# Patient Record
Sex: MALE | URBAN - METROPOLITAN AREA
[De-identification: names, ages, dates, MRNs, and addresses within clinical notes are randomized per-mention and may not be internally consistent; named-entity substitution may affect disease eponyms.]

---

## 2021-05-27 ENCOUNTER — OFFICE VISIT (OUTPATIENT)
Dept: URBAN - METROPOLITAN AREA CLINIC 91 | Facility: CLINIC | Age: 76
End: 2021-05-27
Payer: MEDICARE

## 2021-05-27 DIAGNOSIS — H40.1131 PRIMARY OPEN-ANGLE GLAUCOMA, BILATERAL, MILD STAGE: Primary | ICD-10-CM

## 2021-05-27 DIAGNOSIS — H04.123 DRY EYE SYNDROME OF BILATERAL LACRIMAL GLANDS: ICD-10-CM

## 2021-05-27 PROCEDURE — 92083 EXTENDED VISUAL FIELD XM: CPT | Performed by: SPECIALIST

## 2021-05-27 PROCEDURE — 92133 CPTRZD OPH DX IMG PST SGM ON: CPT | Performed by: SPECIALIST

## 2021-05-27 PROCEDURE — 99214 OFFICE O/P EST MOD 30 MIN: CPT | Performed by: SPECIALIST

## 2021-05-27 ASSESSMENT — INTRAOCULAR PRESSURE
OD: 14
OS: 13

## 2021-05-27 NOTE — IMPRESSION/PLAN
Impression: Primary open-angle glaucoma, bilateral, mild stage: H40.1131. Plan: IOP's and VF stable OU, Continue Lumigan OU QHS. Discussed primary open angle glaucoma. I have stressed importance of patient compliance with follow-up appointments and using glaucoma drops as directed. I have explained the risk of irreversible vision loss and possible blindness associated with glaucoma. Will continue to monitor patient's glaucoma status with OCTg, visual field testing and fundus photography.

## 2021-11-30 ENCOUNTER — OFFICE VISIT (OUTPATIENT)
Dept: URBAN - METROPOLITAN AREA CLINIC 91 | Facility: CLINIC | Age: 76
End: 2021-11-30
Payer: MEDICARE

## 2021-11-30 DIAGNOSIS — H25.13 AGE-RELATED NUCLEAR CATARACT, BILATERAL: ICD-10-CM

## 2021-11-30 PROCEDURE — 99213 OFFICE O/P EST LOW 20 MIN: CPT | Performed by: SPECIALIST

## 2021-11-30 RX ORDER — BIMATOPROST 0.1 MG/ML
0.01 % SOLUTION/ DROPS OPHTHALMIC
Qty: 7.5 | Refills: 3 | Status: ACTIVE
Start: 2021-11-30

## 2021-11-30 ASSESSMENT — INTRAOCULAR PRESSURE
OS: 16
OD: 16

## 2021-11-30 NOTE — IMPRESSION/PLAN
Impression: Primary open-angle glaucoma, bilateral, mild stage: H40.1131. Plan: IOP well controlled OU and Fundus photo taken today OU. Patient advised to continue use of Lumigan OU QHS. Discussed primary open angle glaucoma. I have stressed importance of patient compliance with follow-up appointments and using glaucoma drops as directed. I have explained the risk of irreversible vision loss and possible blindness associated with glaucoma. Will continue to monitor patient's glaucoma status with OCTg, visual field testing and fundus photography.

## 2022-06-02 ENCOUNTER — OFFICE VISIT (OUTPATIENT)
Dept: URBAN - METROPOLITAN AREA CLINIC 91 | Facility: CLINIC | Age: 77
End: 2022-06-02
Payer: MEDICARE

## 2022-06-02 DIAGNOSIS — H25.13 AGE-RELATED NUCLEAR CATARACT, BILATERAL: ICD-10-CM

## 2022-06-02 DIAGNOSIS — H40.1131 PRIMARY OPEN-ANGLE GLAUCOMA, BILATERAL, MILD STAGE: Primary | ICD-10-CM

## 2022-06-02 DIAGNOSIS — H04.123 DRY EYE SYNDROME OF BILATERAL LACRIMAL GLANDS: ICD-10-CM

## 2022-06-02 DIAGNOSIS — H52.4 PRESBYOPIA: ICD-10-CM

## 2022-06-02 PROCEDURE — 92133 CPTRZD OPH DX IMG PST SGM ON: CPT | Performed by: SPECIALIST

## 2022-06-02 PROCEDURE — 99214 OFFICE O/P EST MOD 30 MIN: CPT | Performed by: SPECIALIST

## 2022-06-02 RX ORDER — BEPOTASTINE BESILATE 15 MG/ML
1.5 % SOLUTION/ DROPS OPHTHALMIC
Qty: 5 | Refills: 2 | Status: ACTIVE
Start: 2022-06-02

## 2022-06-02 ASSESSMENT — INTRAOCULAR PRESSURE
OS: 16
OD: 16

## 2022-06-02 ASSESSMENT — VISUAL ACUITY: OD: 20/20

## 2022-06-02 NOTE — IMPRESSION/PLAN
Impression: Dry eye syndrome of bilateral lacrimal glands: H04.123. Plan: For dry eye syndrome, I have recommended patient use a good quality artificial tear 4-6 times per day. Ok to start Bepreve BID OU as needed.

## 2022-06-02 NOTE — IMPRESSION/PLAN
Impression: Primary open-angle glaucoma, bilateral, mild stage: H40.1131. Plan: IOP's and OCTg stable OU, continue using Lumigan QHS OU. Discussed primary open angle glaucoma. I have stressed importance of patient compliance with follow-up appointments and using glaucoma drops as directed. I have explained the risk of irreversible vision loss and possible blindness associated with glaucoma. Will continue to monitor patient's glaucoma status with OCTg, visual field testing and fundus photography.

## 2022-12-07 ENCOUNTER — OFFICE VISIT (OUTPATIENT)
Dept: URBAN - METROPOLITAN AREA CLINIC 91 | Facility: CLINIC | Age: 77
End: 2022-12-07
Payer: MEDICARE

## 2022-12-07 DIAGNOSIS — H40.1131 PRIMARY OPEN-ANGLE GLAUCOMA, BILATERAL, MILD STAGE: Primary | ICD-10-CM

## 2022-12-07 DIAGNOSIS — H16.223 KERATOCONJUNCT SICCA, NOT SPECIFIED AS SJOGREN'S, BILATERAL: ICD-10-CM

## 2022-12-07 PROCEDURE — 99213 OFFICE O/P EST LOW 20 MIN: CPT | Performed by: SPECIALIST

## 2022-12-07 PROCEDURE — 92083 EXTENDED VISUAL FIELD XM: CPT | Performed by: SPECIALIST

## 2022-12-07 RX ORDER — CYCLOSPORINE 0.5 MG/ML
0.05 % EMULSION OPHTHALMIC
Qty: 180 | Refills: 3 | Status: ACTIVE
Start: 2022-12-07

## 2022-12-07 ASSESSMENT — INTRAOCULAR PRESSURE
OS: 19
OD: 16

## 2022-12-07 NOTE — IMPRESSION/PLAN
Impression: Keratoconjunct sicca, not specified as Sjogren's, bilateral: E50.509. Plan: For severe dry eye syndrome, I have recommended Restasis or Job Hilda for the patient. Discussed options of punctal plugs, Lipiflow, AT's gtts 3-6x a day.

## 2022-12-07 NOTE — IMPRESSION/PLAN
Impression: Primary open-angle glaucoma, bilateral, mild stage: H40.1131. Plan: IOP stable VF stable OU, continue using Lumigan QHS OU. Discussed primary open angle glaucoma. I have stressed importance of patient compliance with follow-up appointments and using glaucoma drops as directed. I have explained the risk of irreversible vision loss and possible blindness associated with glaucoma. Will continue to monitor patient's glaucoma status with OCTg, visual field testing and fundus photography.

## 2023-06-07 ENCOUNTER — OFFICE VISIT (OUTPATIENT)
Dept: URBAN - METROPOLITAN AREA CLINIC 91 | Facility: CLINIC | Age: 78
End: 2023-06-07
Payer: MEDICARE

## 2023-06-07 DIAGNOSIS — H25.13 AGE-RELATED NUCLEAR CATARACT, BILATERAL: ICD-10-CM

## 2023-06-07 DIAGNOSIS — H16.223 KERATOCONJUNCT SICCA, NOT SPECIFIED AS SJOGREN'S, BILATERAL: ICD-10-CM

## 2023-06-07 DIAGNOSIS — H40.1131 PRIMARY OPEN-ANGLE GLAUCOMA, BILATERAL, MILD STAGE: Primary | ICD-10-CM

## 2023-06-07 PROCEDURE — 99214 OFFICE O/P EST MOD 30 MIN: CPT | Performed by: SPECIALIST

## 2023-06-07 ASSESSMENT — INTRAOCULAR PRESSURE
OS: 13
OD: 14

## 2023-06-07 NOTE — IMPRESSION/PLAN
Impression: Primary open-angle glaucoma, bilateral, mild stage: H40.1131. Plan: IOP stable, continue using Lumigan QHS OU. Discussed primary open angle glaucoma. I have stressed importance of patient compliance with follow-up appointments and using glaucoma drops as directed. I have explained the risk of irreversible vision loss and possible blindness associated with glaucoma. Will continue to monitor patient's glaucoma status with OCTg, visual field testing and fundus photography.

## 2023-06-07 NOTE — IMPRESSION/PLAN
Impression: Keratoconjunct sicca, not specified as Sjogren's, bilateral: G28.833. Plan: For severe KCS, I have recommended Restasis or Ngozi Sanchez for the patient. Due to severity of dry eyes,  decreased tear break up time, and no relief with artificial tears, patient will benefit from use of Restasis or Xiidra to relieve severe dry eyes.

## 2024-01-22 ENCOUNTER — OFFICE VISIT (OUTPATIENT)
Dept: URBAN - METROPOLITAN AREA CLINIC 91 | Facility: CLINIC | Age: 79
End: 2024-01-22
Payer: MEDICARE

## 2024-01-22 DIAGNOSIS — H40.1131 PRIMARY OPEN-ANGLE GLAUCOMA, BILATERAL, MILD STAGE: Primary | ICD-10-CM

## 2024-01-22 DIAGNOSIS — H25.13 AGE-RELATED NUCLEAR CATARACT, BILATERAL: ICD-10-CM

## 2024-01-22 DIAGNOSIS — H16.223 KERATOCONJUNCT SICCA, NOT SPECIFIED AS SJOGREN'S, BILATERAL: ICD-10-CM

## 2024-01-22 PROCEDURE — 92133 CPTRZD OPH DX IMG PST SGM ON: CPT | Performed by: SPECIALIST

## 2024-01-22 PROCEDURE — 92083 EXTENDED VISUAL FIELD XM: CPT | Performed by: SPECIALIST

## 2024-01-22 PROCEDURE — 76514 ECHO EXAM OF EYE THICKNESS: CPT | Performed by: SPECIALIST

## 2024-01-22 PROCEDURE — 99213 OFFICE O/P EST LOW 20 MIN: CPT | Performed by: SPECIALIST

## 2024-01-22 RX ORDER — BIMATOPROST 0.1 MG/ML
0.01 % SOLUTION/ DROPS OPHTHALMIC
Qty: 5 | Refills: 5 | Status: ACTIVE
Start: 2024-01-22

## 2024-01-22 RX ORDER — CYCLOSPORINE 0.5 MG/ML
0.05 % EMULSION OPHTHALMIC
Qty: 180 | Refills: 3 | Status: ACTIVE
Start: 2024-01-22

## 2024-01-22 ASSESSMENT — VISUAL ACUITY: OD: 20/20

## 2024-01-22 ASSESSMENT — INTRAOCULAR PRESSURE
OS: 10
OD: 14

## 2024-09-03 ENCOUNTER — OFFICE VISIT (OUTPATIENT)
Dept: URBAN - METROPOLITAN AREA CLINIC 91 | Facility: CLINIC | Age: 79
End: 2024-09-03
Payer: MEDICARE

## 2024-09-03 DIAGNOSIS — H40.1131 PRIMARY OPEN-ANGLE GLAUCOMA, BILATERAL, MILD STAGE: Primary | ICD-10-CM

## 2024-09-03 DIAGNOSIS — H25.13 AGE-RELATED NUCLEAR CATARACT, BILATERAL: ICD-10-CM

## 2024-09-03 PROCEDURE — 99214 OFFICE O/P EST MOD 30 MIN: CPT | Performed by: SPECIALIST

## 2024-09-03 PROCEDURE — 92133 CPTRZD OPH DX IMG PST SGM ON: CPT | Performed by: SPECIALIST

## 2024-09-03 RX ORDER — BIMATOPROST 0.1 MG/ML
0.01 % SOLUTION/ DROPS OPHTHALMIC
Qty: 7.5 | Refills: 3 | Status: ACTIVE
Start: 2024-09-03

## 2024-09-03 ASSESSMENT — INTRAOCULAR PRESSURE
OS: 10
OD: 12

## 2025-03-04 ENCOUNTER — OFFICE VISIT (OUTPATIENT)
Facility: LOCATION | Age: 80
End: 2025-03-04
Payer: MEDICARE

## 2025-03-04 DIAGNOSIS — H40.1131 PRIMARY OPEN-ANGLE GLAUCOMA, BILATERAL, MILD STAGE: Primary | ICD-10-CM

## 2025-03-04 DIAGNOSIS — H25.13 AGE-RELATED NUCLEAR CATARACT, BILATERAL: ICD-10-CM

## 2025-03-04 RX ORDER — BIMATOPROST 0.1 MG/ML
0.01 % SOLUTION/ DROPS OPHTHALMIC
Qty: 7.5 | Refills: 6 | Status: ACTIVE
Start: 2025-03-04

## 2025-03-04 ASSESSMENT — INTRAOCULAR PRESSURE
OD: 14
OS: 11